# Patient Record
Sex: MALE | Race: WHITE
[De-identification: names, ages, dates, MRNs, and addresses within clinical notes are randomized per-mention and may not be internally consistent; named-entity substitution may affect disease eponyms.]

---

## 2020-12-07 ENCOUNTER — RX ONLY (RX ONLY)
Age: 50
End: 2020-12-07

## 2021-03-03 ENCOUNTER — RX ONLY (RX ONLY)
Age: 51
End: 2021-03-03

## 2023-03-15 ENCOUNTER — APPOINTMENT (OUTPATIENT)
Dept: URBAN - METROPOLITAN AREA CLINIC 187 | Age: 53
Setting detail: DERMATOLOGY
End: 2023-03-15

## 2023-03-15 DIAGNOSIS — L20.89 OTHER ATOPIC DERMATITIS: ICD-10-CM

## 2023-03-15 PROCEDURE — OTHER DUPIXENT INITIATION: OTHER

## 2023-03-15 PROCEDURE — OTHER MIPS QUALITY: OTHER

## 2023-03-15 PROCEDURE — OTHER PRESCRIPTION MEDICATION MANAGEMENT: OTHER

## 2023-03-15 PROCEDURE — 99214 OFFICE O/P EST MOD 30 MIN: CPT

## 2023-03-15 ASSESSMENT — LOCATION ZONE DERM: LOCATION ZONE: HAND

## 2023-03-15 ASSESSMENT — SEVERITY ASSESSMENT 2020: SEVERITY 2020: MODERATE

## 2023-03-15 ASSESSMENT — LOCATION DETAILED DESCRIPTION DERM
LOCATION DETAILED: RIGHT ULNAR PALM
LOCATION DETAILED: LEFT RADIAL PALM

## 2023-03-15 ASSESSMENT — LOCATION SIMPLE DESCRIPTION DERM
LOCATION SIMPLE: RIGHT HAND
LOCATION SIMPLE: LEFT HAND

## 2023-03-15 ASSESSMENT — BSA ECZEMA: % BODY COVERED IN ECZEMA: 10

## 2023-03-15 ASSESSMENT — ITCH NUMERIC RATING SCALE: HOW SEVERE IS YOUR ITCHING?: 8

## 2023-03-15 NOTE — PROCEDURE: DUPIXENT INITIATION
Detail Level: Generalized
Is Phototherapy Contraindicated?: Yes
Dupixent Monitoring Guidelines: There is no laboratory monitoring requirement with Dupixent.
Is Mycophenalate Contraindicated?: No
Dupixent Dosing: 600 mg SC day 0 then 300 mg SC every other week
Diagnosis (Required): Atopic Dermatitis/Eczematous Dermatitis
Pregnancy And Lactation Warning Text: There have not been adverse fetal risks in women taking Dupixent while pregnant. It is unknown if this medication is excreted in breast milk.

## 2023-03-15 NOTE — PROCEDURE: PRESCRIPTION MEDICATION MANAGEMENT
Continue Regimen: Clobetasol \\nElidel
Detail Level: Zone
Plan: -Plan to try and get Dupixent approved with Ins. since Dupixent is the only medication that actually worked \\n-If Dupixent cannot get approved will plan to try Opzulera
Render In Strict Bullet Format?: No

## 2023-03-15 NOTE — HPI: RASH (ATOPIC DERMATITIS)
How Severe Is Your Atopic Dermatitis?: moderate
Is This A New Presentation, Or A Follow-Up?: Follow Up Atopic Dermatitis
Additional History: Dupixent is the only thing that helped

## 2023-04-05 ENCOUNTER — RX ONLY (RX ONLY)
Age: 53
End: 2023-04-05

## 2023-04-05 RX ORDER — RUXOLITINIB 15 MG/G
CREAM TOPICAL
Qty: 240 | Refills: 3 | Status: ERX | COMMUNITY
Start: 2023-04-05